# Patient Record
Sex: MALE | Race: ASIAN | Employment: FULL TIME | URBAN - METROPOLITAN AREA
[De-identification: names, ages, dates, MRNs, and addresses within clinical notes are randomized per-mention and may not be internally consistent; named-entity substitution may affect disease eponyms.]

---

## 2021-08-23 PROBLEM — E78.5 TYPE 2 DIABETES MELLITUS WITH HYPERLIPIDEMIA  (HCC): Status: ACTIVE | Noted: 2021-08-23

## 2021-08-23 PROBLEM — E78.2 MIXED HYPERLIPIDEMIA: Status: ACTIVE | Noted: 2021-08-23

## 2021-08-23 PROBLEM — E11.69 TYPE 2 DIABETES MELLITUS WITH HYPERLIPIDEMIA (HCC): Status: ACTIVE | Noted: 2021-08-23

## 2021-08-23 PROBLEM — E11.69 TYPE 2 DIABETES MELLITUS WITH HYPERLIPIDEMIA  (HCC): Status: ACTIVE | Noted: 2021-08-23

## 2021-08-23 PROBLEM — Z79.4 LONG-TERM INSULIN USE (HCC): Status: ACTIVE | Noted: 2021-08-23

## 2021-08-23 PROBLEM — N48.1 BALANITIS: Status: ACTIVE | Noted: 2021-08-23

## 2021-08-23 PROBLEM — E78.5 TYPE 2 DIABETES MELLITUS WITH HYPERLIPIDEMIA (HCC): Status: ACTIVE | Noted: 2021-08-23

## 2021-12-02 PROBLEM — F10.20 ALCOHOLISM (HCC): Status: ACTIVE | Noted: 2021-12-02

## 2021-12-02 PROBLEM — I10 ESSENTIAL HYPERTENSION: Status: ACTIVE | Noted: 2021-12-02

## 2021-12-02 PROBLEM — N47.1 PHIMOSIS OF PENIS: Status: ACTIVE | Noted: 2021-12-02

## 2021-12-02 PROBLEM — Z91.148 NONCOMPLIANCE WITH MEDICATIONS: Status: ACTIVE | Noted: 2021-12-02

## 2021-12-02 PROBLEM — IMO0002 UNCONTROLLED TYPE 2 DIABETES MELLITUS, WITH LONG-TERM CURRENT USE OF INSULIN: Status: ACTIVE | Noted: 2021-08-23

## 2021-12-02 PROBLEM — Z91.14 NONCOMPLIANCE WITH MEDICATIONS: Status: ACTIVE | Noted: 2021-12-02

## 2022-06-28 NOTE — ED QUICK NOTES
Patient states having sleep apnea. Patient would desaturate to 71% on RA during his sleep. 3L per NC of oxygen applied, patient is saturating 95%.

## 2022-06-28 NOTE — ED QUICK NOTES
Rounding Completed    Bed is locked and in lowest position. Call light within reach. Sitter at bedside.

## 2022-06-28 NOTE — ED QUICK NOTES
Discharge papers given, pt yelling at staff about privacy and not telling any other [facility] people he is here. Swearing at [employer], explained security can return if pt does not stop yelling at [employer] and swearing.

## 2022-06-28 NOTE — ED QUICK NOTES
Sitter at bs went in to assit pt trying to get up going to the bathroom. He started swearing and yelling at sitter. RN jerad to speak to patient, he is requesting to leave. Pt on room air, o2 sat 05%, pt calmed down, ermd to go in to see him. Security at bs due to swearing and aggression.

## 2022-06-28 NOTE — ED PROVIDER NOTES
Patient reassessed as he is anxious for discharge. Alert oriented and steady gait. States he would like to leave by [place]. Denies any suicidal or homicidal ideations. Admits to intoxication last night.   Denies any offers for referrals for detox

## 2022-06-28 NOTE — ED INITIAL ASSESSMENT (HPI)
EMS WAS CALLED BY A  BECAUSE PATIENT WAS ACTING STRANGE. PATIENT ADMITS TAKING ECSTASY, COCAINE AND ETOH. . PATIENT [id number] PER EMS.

## 2024-01-30 ENCOUNTER — HOSPITAL ENCOUNTER (EMERGENCY)
Facility: HOSPITAL | Age: 44
Discharge: HOME | End: 2024-01-30
Attending: EMERGENCY MEDICINE
Payer: COMMERCIAL

## 2024-01-30 VITALS
OXYGEN SATURATION: 96 % | HEART RATE: 81 BPM | SYSTOLIC BLOOD PRESSURE: 163 MMHG | DIASTOLIC BLOOD PRESSURE: 99 MMHG | BODY MASS INDEX: 28.79 KG/M2 | RESPIRATION RATE: 20 BRPM | WEIGHT: 190 LBS | HEIGHT: 68 IN | TEMPERATURE: 97 F

## 2024-01-30 DIAGNOSIS — L03.019 ONYCHIA AND PARONYCHIA OF FINGER: Primary | ICD-10-CM

## 2024-01-30 PROCEDURE — 10060 I&D ABSCESS SIMPLE/SINGLE: CPT

## 2024-01-30 PROCEDURE — 99283 EMERGENCY DEPT VISIT LOW MDM: CPT

## 2024-01-31 NOTE — ED PROVIDER NOTES
Patient Seen in: [facility] Emergency Department    History     Chief Complaint   Patient presents with    Arm or Hand Injury       HPI    Patient presents to the ED complaining of an infection to his left thumb.  Symptoms for the past 2 days.  Pain is severe.    History reviewed.   Past Medical History:   Diagnosis Date    Diabetes (HCC)     Essential hypertension        History reviewed.   Past Surgical History:   Procedure Laterality Date    CIRCUMCISION NON-           Medications :  (Not in a hospital admission)       No family history on file.    Smoking Status:   Social History     Socioeconomic History    Marital status:    Tobacco Use    Smoking status: Never    Smokeless tobacco: Never   Vaping Use    Vaping Use: Never used   Substance and Sexual Activity    Alcohol use: Never    Drug use: Never       Constitutional and vital signs reviewed.      Social History and Family History elements reviewed from today, pertinent positives to the presenting problem noted.    Physical Exam     ED Triage Vitals [24]   BP (!) 163/99   Pulse 84   Resp 20   Temp 97 °F (36.1 °C)   Temp src Temporal   SpO2 95 %   O2 Device None (Room air)       All measures to prevent infection transmission during my interaction with the patient were taken. The patient was already wearing a droplet mask on my arrival to the room. Personal protective equipment was worn throughout the duration of the exam.  Handwashing was performed prior to and after the exam.  Stethoscope and any equipment used during my examination was cleaned with super sani-cloth germicidal wipes following the exam.     Physical Exam  Constitutional:       Appearance: Normal appearance.   Pulmonary:      Effort: Pulmonary effort is normal. No respiratory distress.   Musculoskeletal:      Comments: Paronychia noted to the left thumb.  Pus visible underneath the cuticle to the ulnar side of the thumb   Neurological:      Mental Status: He  is alert. Mental status is at baseline.   Psychiatric:         Mood and Affect: Mood normal.         Behavior: Behavior normal.         ED Course      Labs Reviewed - No data to display    As Interpreted by me    Imaging Results Available and Reviewed while in ED: No results found.  ED Medications Administered: Medications - No data to display      MDM     Vitals:    01/30/24 2011 01/30/24 2134   BP: (!) 163/99    Pulse: 84 81   Resp: 20 20   Temp: 97 °F (36.1 °C)    TempSrc: Temporal    SpO2: 95% 96%   Weight: 86.2 kg    Height: 172.7 cm (5' 8\")      *I personally reviewed and interpreted all ED vitals.    Pulse Ox: 96%, Room air, Normal     Differential Diagnosis/ Diagnostic Considerations: Left thumb paronychia    Complicating Factors: The patient already has does not have any pertinent problems on file. to contribute to the complexity of this ED evaluation.    Medical Decision Making  Patient presents to the ED with a paronychia to his left thumb.  This was incised and drained by myself.  Stable for discharge home with hot running water compresses and supportive care.    Procedure:  Abscess drainage:  The patient's abscess was located to the left thumb.   I obtained verbal consent from the patient to drain the abscess. Patient was informed about the possibility of bleeding, pain and worsening of the condition.  The abscess was incised with a scalpel a small amount of purulent drainage was expressed.   I irrigated and dressed the wound.  The patient tolerated the procedure well.  The procedure was performed by myself.      Problems Addressed:  Onychia and paronychia of finger: acute illness or injury    Risk  Minor surgery with identified risk factors.        Condition upon leaving the department: Stable    Disposition and Plan     Clinical Impression:  1. Onychia and paronychia of finger        Disposition:  Discharge    Follow-up:  Puma Crowell MD  [address] [facility]  [address] [place] [address]  [id number]  [phone]    Schedule an appointment as soon as possible for a visit in 1 week(s)        Medications Prescribed:  Discharge Medication List as of 1/30/2024  9:34 PM

## 2024-01-31 NOTE — ED INITIAL ASSESSMENT (HPI)
PT to ED, reporting concern for ingrown nail on L first digit, swollen, no relief from ice at home. Pain 10/10, L hand first digit non radiating, throbbing.

## 2024-01-31 NOTE — ED QUICK NOTES
Patient safe to discharge home per ED Provider. Discharge education provided, including follow up instructions.Patient verbalizes understanding.   Patient reports not taking BP medications today. This RN educated patient regarding BP. Patient verbalized understanding.